# Patient Record
(demographics unavailable — no encounter records)

---

## 2024-10-22 NOTE — DISCUSSION/SUMMARY
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  It would be unusual for the gagging to be secondary to epilepsy.  However, it is interesting that the episodes decreased with the use of increased Tegretol and Keppra use.  It would be very difficult to perform an extended EEG on this patient to capture an event and sedation cannot be done for EEG testing.    I would like to continue to try using AEDs to see if can be secondary to seizure.  At this point, since she is on Tegretol, I would want to use a medication that does not interact.  I would like to continue Lyrica at 100mg tid.  It must be noted that the patient's WBC count dropped when CBZ was raised and that cannot be done again.  Slight increase in WBC with lowering CBZ to 200/100/200 and level still good.  Check CBC/CMP and CBZ levels in May (when sees Hematology). Check Dexa - if low bone density may need help of rheumatology or endocrinology.  Patient will f/u 6 months. Total time 30 min.

## 2024-10-22 NOTE — HISTORY OF PRESENT ILLNESS
[FreeTextEntry1] : Update 10/520435: Patient doing well overall. No events. Recently went to wedding in [facility]. Facial rubbing improved. Continues on CBZ and LTG with plan to f/u with Dr. Mar in May for mildly lower WBC. Klonopin 0.75 in morning, 0.5 at school and 0.25 when comes home.   Update 4/16/2024: Patient doing well overall with behavior and at school. No events.  Some facial rubbing still at this time, somewhat improved. Off Lexapro since was changing behavior. Klonopin 0.75 in morning, 0.5 at school and 0.25 when comes home.   Update 10/10/23: Patient doing well overall with behavior and at school. No events. Saw GI for episodes of morning vomiting who thinks may be GI vs behavioral vs hx of neurologic causes. Prevacid was moved to night time.   Update 1/24/23: Patient doing well overall with behavior and at school. No events.  Update 1/4/22: Patient with recent cold-like symptoms with COVID test to be done tomorrow. No seizures. Otherwise well.  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  Also history of hemochromatosis.   The patient had her first seizure of life at the age of 3yo.  At the time it was associated with fever and thought to be a febrile seizure.  At the age of 5yo, she had a seizure (convulsion) without fever and was started on Tegretol. She has remained on Tegretol (CBZ) since that time and is currently on 200mg TID (chewable).  She has not had further convulsive seizure activity.  However, she has had for many years episodes of gagging that were evaluated by GI with no known cause.  A neurologic workup showed septo-optic dysplasia on MRI and REEG was normal.  She was seen by Dr. Jiang who started her on Keppra.  Keppra decreased her appetite and was stopped.  She was also tried on /200/300 which decreased the gagging episodes but lowered her WBC count.   Of note, she also has behavioral issues and aggression.  Has also been started on Klonopin 0.5 bid for behavior reasons and is on Tenex as well for behavior modification (from peds neuro).  We started her on Lyrica 75mg bid to see if helps with the gagging episodes and doing somewhat better, increased to 100mg bid but still with episodes.  Now has had low WBC count (2.8) with questions if 2/2 CBZ.  Her CBZ was lowered to 200/100/200 and Lyrica increased to 100mg tid.  Her WBC increased has been stable.  Some issues with behavior when going out.  PMHx: as above SocHx: goes to day program; lives at home FHx: no epilepsy Meds: see above and below All:NKDA (keppra decrease appetite).

## 2024-10-22 NOTE — PHYSICAL EXAM
[FreeTextEntry1] : Patient is awake and alert and minimally verbal.\par  Unable to open eyes to asses\par  Face symmetric and uvula midline\par  5/5 b/l motor strength upper and lower limbs\par  Normal gait\par   [General Appearance - Alert] : alert [General Appearance - In No Acute Distress] : in no acute distress [General Appearance - Well Nourished] : well nourished [General Appearance - Well Developed] : well developed [Full Pulse] : the pedal pulses are present [Edema] : there was no peripheral edema [Abnormal Walk] : normal gait [Nail Clubbing] : no clubbing  or cyanosis of the fingernails [Musculoskeletal - Swelling] : no joint swelling seen [Motor Tone] : muscle strength and tone were normal

## 2025-05-20 NOTE — REASON FOR VISIT
[Follow-Up Visit] : a follow-up visit for [Blood Count Assessment] : blood count assessment [Parent] : parent

## 2025-05-20 NOTE — ADDENDUM
[FreeTextEntry1] : Documented by Azul Zaldivar acting as a scribe for Dr. Otto Mar on 05/20/2025. All medical record entries made by the Scribe were at my, Dr. Otto Mar, direction and personally dictated by me on 05/20/2025. I have reviewed the chart and agree that the record accurately reflects my personal performance of the history, physical exam, assessment and plan. I have also personally directed, reviewed, and agree with the discharge instructions.

## 2025-05-20 NOTE — ASSESSMENT
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 MRI of her liver demonstrated an increased iron concentration which was in the range that is expected for patients undergoing chelation therapy for secondary iron overload. She has been menstruating and her ferritin level has decreased.  Phlebotomy would be the standard therapy to reduce her iron overload. Her hemoglobin is adequate for this. However, given her disabilities, phlebotomy may be traumatic. There is no pressing indication to begin phlebotomy at this time. I favor continuing to induce menses and monitoring her iron stores going forward. Iron chelation therapy is potentially toxic, would be difficult for her to tolerate, expensive, and not indicated unless menstrual bleeding or phlebotomy are not successful.  Her mild leukopenia with relative lymphocytosis and an adequate ANC resolved with decrease of carbamazepine dose.  Plan: Continue to allow menstrual bleeding CMP, Fe/TIBC, ferritin, AFP, TFTs, carbamazepine level RTC in 1 year

## 2025-05-20 NOTE — RESULTS/DATA
[FreeTextEntry1] : WBC 4,210 Hgb 12.8 Hct 39.1 MCV 96.3 Diff normal ANC 2,140  5/21/2024 CMP normal Ferritin 239 Fe sat% 68% TSH 1.73 T3 uptake% 27% T4 6.6 AFP <1.8

## 2025-05-20 NOTE — HISTORY OF PRESENT ILLNESS
[Disease:__________________________] : Disease: [unfilled] [de-identified] : Leukopenia       [de-identified] : Homozygous C282Y [de-identified] : Her menses are light. She has reflux. She notes no fever, headaches, visual problems, chest pain, SOB, heart palpitations, abdominal pain, leg pain/swelling, ankle swelling, bleeding, bruising, pain in the joints, swollen glands. She has not received a COVID booster or Flu shot.

## 2025-05-20 NOTE — CONSULT LETTER
[Dear  ___] : Dear  [unfilled], [Courtesy Letter:] : I had the pleasure of seeing your patient, [unfilled], in my office today. [Please see my note below.] : Please see my note below. [Sincerely,] : Sincerely, [DrGalileo  ___] : Dr. EGAN [DrGalileo ___] : Dr. EGAN [FreeTextEntry2] : Dr. Tamara Cohen [FreeTextEntry3] : Otto Mar M.D., FACP\par  Professor of Medicine\par  [employer]\par  Associate Chief, Division of Hematology\par  [facility]\par  [facility]\par  [address] [place] [address]\[place] [address]\par  [phone]\par  \par  \par  \par